# Patient Record
Sex: FEMALE | Race: WHITE
[De-identification: names, ages, dates, MRNs, and addresses within clinical notes are randomized per-mention and may not be internally consistent; named-entity substitution may affect disease eponyms.]

---

## 2019-01-08 ENCOUNTER — HOSPITAL ENCOUNTER (EMERGENCY)
Dept: HOSPITAL 18 - NAV ERS | Age: 39
Discharge: HOME | End: 2019-01-08
Payer: SELF-PAY

## 2019-01-08 DIAGNOSIS — F17.210: ICD-10-CM

## 2019-01-08 DIAGNOSIS — M54.42: Primary | ICD-10-CM

## 2019-01-08 DIAGNOSIS — E03.9: ICD-10-CM

## 2019-01-08 DIAGNOSIS — N20.0: ICD-10-CM

## 2019-01-08 PROCEDURE — 72131 CT LUMBAR SPINE W/O DYE: CPT

## 2019-01-08 PROCEDURE — 96372 THER/PROPH/DIAG INJ SC/IM: CPT

## 2019-01-08 NOTE — CT
CT OF THE LUMBAR SPINE:

 

Date: 1-8-19 

 

History: Low back pain radiating into the left lower extremity. 

 

Technique: Axial CT imaging obtained at 2.5 mm intervals from lower thoracic spine through lower sacr
um without contrast. Coronal and sagittal reformatted imaging obtained. 

 

FINDINGS: 

There are multiple infrarenal calculi on the right measuring up to 5-6 mm. Imaged retroperitoneal str
uctures grossly unremarkable otherwise. 

 

Evaluation for central canal and/or neural foraminal stenosis limited on routine CT. 

 

Probable punctate nonobstructing stone and upper pole of the left kidney noted as well. 

 

There is no lytic or blastic bone lesion seen. There is no evidence for acute fracture or dislocation
 noted. 

 

T12-L1: No osseous cause of significant central canal or neural foraminal stenosis. 

 

L1-2: No osseous cause of significant central canal or neural foraminal stenosis. 

 

L2-3: No osseous cause of significant central canal or neural foraminal stenosis. 

 

L3-4: No osseous cause of significant central canal or neural foraminal stenosis. 

 

L4-5: No osseous cause of significant central canal or neural foraminal stenosis. 

 

L5-S1: No osseous cause of significant central canal or neural foraminal stenosis. 

 

No discrete lytic or blastic bone lesion. 

 

IMPRESSION: 

1. Bilateral renal calculi. 

2. No acute osseous abnormality. 

 

If there are radicular symptoms, MRI is suggested. 

 

 

 

POS: [facility]

## 2019-05-29 ENCOUNTER — HOSPITAL ENCOUNTER (EMERGENCY)
Dept: HOSPITAL 18 - NAV ERS | Age: 39
Discharge: HOME | End: 2019-05-29
Payer: SELF-PAY

## 2019-05-29 DIAGNOSIS — E03.9: ICD-10-CM

## 2019-05-29 DIAGNOSIS — S90.31XA: Primary | ICD-10-CM
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
## 2019-05-29 DIAGNOSIS — F17.210: ICD-10-CM

## 2019-05-29 NOTE — RAD
RIGHT FOOT:

5/29/19

 

Three views.

 

INDICATIONS:

Injury. 

 

Tarsals appear intact. Metatarsals and phalanges appear intact. 

 

IMPRESSION: 

No acute findings. 

 

POS: [facility]

## 2019-10-09 ENCOUNTER — HOSPITAL ENCOUNTER (EMERGENCY)
Dept: HOSPITAL 18 - NAV ERS | Age: 39
Discharge: HOME | End: 2019-10-09
Payer: COMMERCIAL

## 2019-10-09 DIAGNOSIS — E03.9: ICD-10-CM

## 2019-10-09 DIAGNOSIS — F17.210: ICD-10-CM

## 2019-10-09 DIAGNOSIS — R07.2: Primary | ICD-10-CM

## 2019-10-09 PROCEDURE — 71046 X-RAY EXAM CHEST 2 VIEWS: CPT

## 2019-10-09 PROCEDURE — 93005 ELECTROCARDIOGRAM TRACING: CPT

## 2019-10-10 NOTE — RAD
PA AND LATERAL VIEWS OF THE CHEST:

 

HISTORY: 

Chest pain.

 

FINDINGS: 

The heart size is normal.  The lungs are expanded without focal areas of consolidation, pneumothorace
s, or pleural effusions.  No acute osseous abnormalities are seen.

 

IMPRESSION: 

No acute process.

 

POS: OFF

## 2020-03-15 ENCOUNTER — HOSPITAL ENCOUNTER (EMERGENCY)
Dept: HOSPITAL 18 - NAV ERS | Age: 40
LOS: 1 days | Discharge: ANOTHER HOSPITAL | End: 2020-03-16
Payer: COMMERCIAL

## 2020-03-15 DIAGNOSIS — F17.210: ICD-10-CM

## 2020-03-15 DIAGNOSIS — K57.20: Primary | ICD-10-CM

## 2020-03-15 DIAGNOSIS — E03.9: ICD-10-CM

## 2020-03-15 LAB
BASOPHILS # BLD AUTO: 0.1 THOU/UL (ref 0–0.2)
BASOPHILS NFR BLD AUTO: 0.9 % (ref 0–1)
EOSINOPHIL # BLD AUTO: 0.6 THOU/UL (ref 0–0.7)
EOSINOPHIL NFR BLD AUTO: 4.6 % (ref 0–10)
HGB BLD-MCNC: 14 G/DL (ref 12–16)
LYMPHOCYTES # BLD AUTO: 3.4 THOU/UL (ref 1.2–3.4)
LYMPHOCYTES NFR BLD AUTO: 25 % (ref 21–51)
MCH RBC QN AUTO: 28.5 PG (ref 27–31)
MCV RBC AUTO: 86.1 FL (ref 78–98)
MONOCYTES # BLD AUTO: 0.6 THOU/UL (ref 0.11–0.59)
MONOCYTES NFR BLD AUTO: 4.7 % (ref 0–10)
NEUTROPHILS # BLD AUTO: 8.9 THOU/UL (ref 1.4–6.5)
NEUTROPHILS NFR BLD AUTO: 64.9 % (ref 42–75)
PLATELET # BLD AUTO: 422 THOU/UL (ref 130–400)
RBC # BLD AUTO: 4.92 MILL/UL (ref 4.2–5.4)
WBC # BLD AUTO: 13.7 THOU/UL (ref 4.8–10.8)

## 2020-03-15 PROCEDURE — 96375 TX/PRO/DX INJ NEW DRUG ADDON: CPT

## 2020-03-15 PROCEDURE — 83690 ASSAY OF LIPASE: CPT

## 2020-03-15 PROCEDURE — 96365 THER/PROPH/DIAG IV INF INIT: CPT

## 2020-03-15 PROCEDURE — 81003 URINALYSIS AUTO W/O SCOPE: CPT

## 2020-03-15 PROCEDURE — 85025 COMPLETE CBC W/AUTO DIFF WBC: CPT

## 2020-03-15 PROCEDURE — 74177 CT ABD & PELVIS W/CONTRAST: CPT

## 2020-03-15 PROCEDURE — 36415 COLL VENOUS BLD VENIPUNCTURE: CPT

## 2020-03-15 PROCEDURE — 81015 MICROSCOPIC EXAM OF URINE: CPT

## 2020-03-15 PROCEDURE — 80053 COMPREHEN METABOLIC PANEL: CPT

## 2020-03-15 PROCEDURE — 81025 URINE PREGNANCY TEST: CPT

## 2020-03-15 PROCEDURE — 83605 ASSAY OF LACTIC ACID: CPT

## 2020-03-16 ENCOUNTER — HOSPITAL ENCOUNTER (INPATIENT)
Dept: HOSPITAL 92 - SURG A | Age: 40
LOS: 1 days | Discharge: HOME | DRG: 392 | End: 2020-03-17
Attending: SPECIALIST | Admitting: SPECIALIST
Payer: COMMERCIAL

## 2020-03-16 VITALS — BODY MASS INDEX: 32.8 KG/M2

## 2020-03-16 DIAGNOSIS — K57.32: Primary | ICD-10-CM

## 2020-03-16 DIAGNOSIS — Z90.710: ICD-10-CM

## 2020-03-16 DIAGNOSIS — E66.9: ICD-10-CM

## 2020-03-16 LAB
ALBUMIN SERPL BCG-MCNC: 4.3 G/DL (ref 3.5–5)
ALP SERPL-CCNC: 123 U/L (ref 40–110)
ALT SERPL W P-5'-P-CCNC: 16 U/L (ref 8–55)
ANION GAP SERPL CALC-SCNC: 18 MMOL/L (ref 10–20)
AST SERPL-CCNC: 13 U/L (ref 5–34)
BACTERIA UR QL AUTO: (no result) HPF
BASOPHILS # BLD AUTO: 0.1 THOU/UL (ref 0–0.2)
BASOPHILS NFR BLD AUTO: 0.5 % (ref 0–1)
BILIRUB SERPL-MCNC: 0.2 MG/DL (ref 0.2–1.2)
BUN SERPL-MCNC: 11 MG/DL (ref 7–18.7)
CALCIUM SERPL-MCNC: 9.5 MG/DL (ref 7.8–10.44)
CHLORIDE SERPL-SCNC: 108 MMOL/L (ref 98–107)
CO2 SERPL-SCNC: 19 MMOL/L (ref 22–29)
CREAT CL PREDICTED SERPL C-G-VRATE: 0 ML/MIN (ref 70–130)
EOSINOPHIL # BLD AUTO: 0.2 THOU/UL (ref 0–0.7)
EOSINOPHIL NFR BLD AUTO: 1.7 % (ref 0–10)
GLOBULIN SER CALC-MCNC: 3.5 G/DL (ref 2.4–3.5)
GLUCOSE SERPL-MCNC: 93 MG/DL (ref 70–105)
HGB BLD-MCNC: 12.8 G/DL (ref 12–16)
LIPASE SERPL-CCNC: 18 U/L (ref 8–78)
LYMPHOCYTES # BLD: 1.8 THOU/UL (ref 1.2–3.4)
LYMPHOCYTES NFR BLD AUTO: 13.5 % (ref 21–51)
MCH RBC QN AUTO: 29.4 PG (ref 27–31)
MCV RBC AUTO: 89.2 FL (ref 78–98)
MONOCYTES # BLD AUTO: 0.8 THOU/UL (ref 0.11–0.59)
MONOCYTES NFR BLD AUTO: 5.8 % (ref 0–10)
NEUTROPHILS # BLD AUTO: 10.4 THOU/UL (ref 1.4–6.5)
NEUTROPHILS NFR BLD AUTO: 78.5 % (ref 42–75)
PLATELET # BLD AUTO: 347 THOU/UL (ref 130–400)
POTASSIUM SERPL-SCNC: 4.1 MMOL/L (ref 3.5–5.1)
PREGU CONTROL BACKGROUND?: (no result)
PREGU CONTROL BAR APPEAR?: YES
RBC # BLD AUTO: 4.35 MILL/UL (ref 4.2–5.4)
SODIUM SERPL-SCNC: 141 MMOL/L (ref 136–145)
WBC # BLD AUTO: 13.3 THOU/UL (ref 4.8–10.8)
WBC UR QL AUTO: (no result) HPF (ref 0–3)

## 2020-03-16 PROCEDURE — 85025 COMPLETE CBC W/AUTO DIFF WBC: CPT

## 2020-03-16 PROCEDURE — S0028 INJECTION, FAMOTIDINE, 20 MG: HCPCS

## 2020-03-16 PROCEDURE — 36415 COLL VENOUS BLD VENIPUNCTURE: CPT

## 2020-03-16 RX ADMIN — Medication SCH: at 21:10

## 2020-03-16 RX ADMIN — FAMOTIDINE SCH MG: 10 INJECTION, SOLUTION INTRAVENOUS at 21:10

## 2020-03-16 RX ADMIN — FAMOTIDINE SCH MG: 10 INJECTION, SOLUTION INTRAVENOUS at 09:15

## 2020-03-16 NOTE — CT
PRELIMINARY REPORT/[facility]/EMERGENCY AFTER HOURS PROCEDURE: 

 

EXAM: 

CT Abdomen and Pelvis with Intravenous Contrast 

 

CLINICAL HISTORY: 

HX OF DIVERTICULTIS X 2 DAYS. PT PRESENTS WITH WORSENING PAIN TONIGHT. 

 

TECHNIQUE: 

Axial computed tomography images of the abdomen and pelvis with intravenous contrast. 

 

CONTRAST: 

With; ISOVUE 370-95mL 

 

COMPARISON: 

None provided. 

 

FINDINGS: 

LUNG BASES: No basilar airspace consolidation or pleural effusion. 

LIVER: Unremarkable. 

GALLBLADDER AND BILE DUCTS: Unremarkable. No calcified stone. No ductal dilation. 

PANCREAS: Unremarkable. 

SPLEEN: Unremarkable. 

ADRENAL GLANDS: Unremarkable. 

KIDNEYS, URETERS, AND BLADDER: Unremarkable. No hydronephrosis or nephrolithiasis. No ureteral or tessa
dder calculi. 

STOMACH AND BOWEL: Inflammatory change in the left lower quadrant involving the sigmoid colon consist
ent with diverticulitis. There is possibly a small degree of fluid within the inflamed area, however 
there is no abscess or gas to suggest devante perforation. There is fluid within the large bowel and to
 a lesser degree, within the small bowel.  There is no evidence of obstruction. 

APPENDIX: No CT evidence for appendicitis. 

PERITONEUM: No free fluid. No free air. 

LYMPH NODES: Prominent left lower quadrant and retroperitoneal lymph nodes which are nonpathologic by
 size criteria. 

REPRODUCTIVE: Unremarkable as visualized. 

VASCULATURE: No aortic aneurysm. 

BONES: No fracture or suspicious osseous abnormality. 

ABDOMINAL WALL AND SOFT TISSUES: Unremarkable. 

 

IMPRESSION: 

1. Findings compatible with diverticulitis in the left lower quadrant with possible microperforation,
 however no devante perforation or abscess. 

2. Fluid within large bowel and to lesser degree within small bowel. Correlate for enterocolitis or o
ther diarrhea causing illness.

 

ELECTRONICALLY SIGNED BY:

Johnson Kennedy M.D.

Mar 16, 2020 12:29:16 AM CDT

 

This report is intended for review by the ordering physician only, in accordance of law. If you recei
ve this report in error, please call [facility] at [phone].

 

 

 

FINAL REPORT 

 

CT ABDOMEN AND PELVIS WITH IV CONTRAST:

 

INDICATION:

History of diverticulitis. 

 

COMPARISON:  

None. 

 

FINDINGS:

There is wall thickening and pericolonic inflammatory stranding involving the sigmoid colon consisten
t with changes of acute diverticulitis. There is fluid density within the colon which may reflect con
dition of mild diarrheal state. There are bilateral nonobstructing renal calculi. 

 

The liver, spleen, pancreas, and adrenal glands are normal appearing. There is a normal appendix in t
he right lower quadrant. Small bowel is of normal caliber. The uterus is surgically absent. Adnexa ar
e not definitely seen. 

 

No drainable fluid collection is evident. 

 

No definite acute osseous abnormality is evident. 

 

IMPRESSION: 

I agree with the preliminary report provided by [facility]. 

1.  Findings are consistent with noncomplicated acute colonic diverticulitis. 

2.  Bilateral nephrolithiasis. 

 

 

POS: BH

## 2020-03-16 NOTE — HP
CHIEF COMPLAINT:  Left lower quadrant abdominal pain.



HISTORY OF PRESENT ILLNESS:  The patient is a 39-year-old moderately obese white

female.  She had presented to the [facility] on Friday, March 13th with a complaint of left lower quadrant abdominal pain.  She was

evaluated at that time and found to have diverticulitis.  She was given outpatient

antibiotics and dietary recommendations.  She returned home in [place], but the

pain recurred and became worse last night and she presented to the hospital in

[place].  She again underwent laboratory and radiologic studies.  Her white blood

cell count was elevated at 13.7.  CT scan was obtained and read by [facility]

to be consistent with diverticulitis with possible microperforation.  For this

reason, I was contacted and she was admitted to my service. 



She denies any prior history of diverticulitis or knowledge of colon abnormality.

She denies any history of significant constipation. 



PAST MEDICAL HISTORY:  Essentially unremarkable.



PAST SURGICAL HISTORY:  Hysterectomy for endometriosis 13 years ago (at age 26),

tonsillectomy. 



MEDICATIONS:  None other than outpatient Levaquin and Flagyl from a recent ER visit.



ALLERGIES:  NO KNOWN DRUG ALLERGIES.



PERSONAL AND SOCIAL HISTORY:  She is .  She has no children.  She smokes one

pack per day of cigarettes.  Drinks alcohol occasionally.  She works at the [employer]

in [place].  She moved here about a year ago from [place] and does not have a current

primary care physician. 



PHYSICAL EXAMINATION:

VITAL SIGNS:  Temperature is 98.2, pulse 84, blood pressure 94/67. 

GENERAL:  She is a well-developed, well-nourished, moderately obese white female,

resting in bed, in no acute distress.  She is alert and oriented x3 and cooperative. 

HEAD, EYES, EARS, NOSE, AND THROAT:  Unremarkable. 

NECK:  Supple without mass or tenderness. 

LUNGS:  Clear to auscultation throughout. 

CARDIAC:  Regular rate and rhythm without murmur. 

ABDOMEN:  Soft with normoactive bowel sounds.  She has focal tenderness in the left

lower quadrant with obvious guarding. 

EXTREMITIES:  Unremarkable.



LABORATORY DATA:  Her chemistry panel from last night shows minimal electrolyte

abnormalities. 



ASSESSMENT:  The patient with obvious diverticulitis.  I do not believe there is any

perforation and this therefore appears to be a standard episode of diverticulitis.

We will keep her on a liquid diet, IV antibiotics until this resolves to where she

is stable for discharge.  I anticipate this will be over the next 48 hours.  If this

is her 1st episode of diverticulitis and there is no obvious perforation, she

certainly does not need surgical intervention at this time. 







Job ID:  [id number]

## 2020-03-17 VITALS — DIASTOLIC BLOOD PRESSURE: 68 MMHG | TEMPERATURE: 98.1 F | SYSTOLIC BLOOD PRESSURE: 100 MMHG

## 2020-03-17 LAB
BASOPHILS # BLD AUTO: 0 THOU/UL (ref 0–0.2)
BASOPHILS NFR BLD AUTO: 0.3 % (ref 0–1)
EOSINOPHIL # BLD AUTO: 0.4 THOU/UL (ref 0–0.7)
EOSINOPHIL NFR BLD AUTO: 4 % (ref 0–10)
HGB BLD-MCNC: 10.6 G/DL (ref 12–16)
LYMPHOCYTES # BLD: 2.6 THOU/UL (ref 1.2–3.4)
LYMPHOCYTES NFR BLD AUTO: 23.5 % (ref 21–51)
MCH RBC QN AUTO: 28.9 PG (ref 27–31)
MCV RBC AUTO: 90.2 FL (ref 78–98)
MONOCYTES # BLD AUTO: 1.1 THOU/UL (ref 0.11–0.59)
MONOCYTES NFR BLD AUTO: 9.9 % (ref 0–10)
NEUTROPHILS # BLD AUTO: 6.9 THOU/UL (ref 1.4–6.5)
NEUTROPHILS NFR BLD AUTO: 62.3 % (ref 42–75)
PLATELET # BLD AUTO: 311 THOU/UL (ref 130–400)
RBC # BLD AUTO: 3.68 MILL/UL (ref 4.2–5.4)
WBC # BLD AUTO: 11 THOU/UL (ref 4.8–10.8)

## 2020-03-17 RX ADMIN — Medication SCH: at 08:20

## 2020-03-17 RX ADMIN — FAMOTIDINE SCH MG: 10 INJECTION, SOLUTION INTRAVENOUS at 08:22
